# Patient Record
Sex: FEMALE | Race: WHITE
[De-identification: names, ages, dates, MRNs, and addresses within clinical notes are randomized per-mention and may not be internally consistent; named-entity substitution may affect disease eponyms.]

---

## 2019-10-01 ENCOUNTER — HOSPITAL ENCOUNTER (EMERGENCY)
Dept: HOSPITAL 47 - EC | Age: 54
LOS: 1 days | Discharge: HOME | End: 2019-10-02
Payer: MEDICARE

## 2019-10-01 VITALS
HEART RATE: 71 BPM | SYSTOLIC BLOOD PRESSURE: 118 MMHG | DIASTOLIC BLOOD PRESSURE: 67 MMHG | RESPIRATION RATE: 18 BRPM | TEMPERATURE: 98.1 F

## 2019-10-01 DIAGNOSIS — Z87.891: ICD-10-CM

## 2019-10-01 DIAGNOSIS — Z88.2: ICD-10-CM

## 2019-10-01 DIAGNOSIS — S63.91XA: Primary | ICD-10-CM

## 2019-10-01 DIAGNOSIS — Z88.1: ICD-10-CM
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
## 2019-10-01 PROCEDURE — 73130 X-RAY EXAM OF HAND: CPT

## 2019-10-01 PROCEDURE — 99283 EMERGENCY DEPT VISIT LOW MDM: CPT

## 2019-10-01 PROCEDURE — 96372 THER/PROPH/DIAG INJ SC/IM: CPT

## 2019-10-02 NOTE — ED
Upper Extremity HPI





- General


Chief Complaint: Extremity Injury, Upper


Stated Complaint: Finger Injury


Time Seen by Provider: 10/02/19 00:03


Source: patient, family


Mode of arrival: ambulatory


Limitations: physical limitation





- History of Present Illness


Initial Comments: 


54-year-old female patient presents to the emergency department today for 

evaluation of injury to the right hand.  Patient states that she was attempting 

to open a ladder became frustrated and slammed it down with her hand.  States 

this causes pain to the right middle MCP joint.  Patient states that the pain 

has been worsening over the last 2 hours.  States it feels like it is crackling 

when she bends it.  Denies any history of injury to the hand.  Denies taking any

medication for her symptoms.  Denies any other injuries or concerns. Patient 

denies any headache, neck pain, back pain, chest pain, shortness of breath, 

dizziness, weakness, abdominal pain, nausea, vomiting, or difficulties with 

bowel movements or urination.








- Related Data


                                    Allergies











Allergy/AdvReac Type Severity Reaction Status Date / Time


 


levofloxacin Allergy  Hallucinati Verified 10/01/19 23:58





   ons  


 


Sulfa (Sulfonamide Allergy  Anaphylaxis Verified 10/01/19 23:58





Antibiotics)     














Review of Systems


ROS Statement: 


Those systems with pertinent positive or pertinent negative responses have been 

documented in the HPI.





ROS Other: All systems not noted in ROS Statement are negative.





Past Medical History


Past Medical History: No Reported History


History of Any Multi-Drug Resistant Organisms: None Reported


Past Surgical History: Adenoidectomy, Appendectomy, Hysterectomy, Tonsillectomy


Additional Past Surgical History / Comment(s): NECK SX CERVICAL 3, 4, LEFT FOOT


Past Psychological History: No Psychological Hx Reported


Smoking Status: Former smoker


Past Alcohol Use History: None Reported


Past Drug Use History: None Reported





General Exam


Limitations: physical limitation


General appearance: alert, in no apparent distress, other (This is a well-

developed, well-nourished adult female patient in no acute distress.  Vital 

signs upon presentation are temperature 98.1F, pulse 71, respirations 18, blood

pressure 118/67, pulse ox 100% on room air.)


Respiratory exam: Present: normal lung sounds bilaterally.  Absent: respiratory 

distress, wheezes, rales, rhonchi, stridor


Cardiovascular Exam: Present: regular rate, normal rhythm, normal heart sounds. 

Absent: systolic murmur, diastolic murmur, rubs, gallop, clicks


Extremities exam: Present: full ROM, tenderness (Over the right third MCP 

joint), normal capillary refill, other (Swelling over the right third MCP joint.

 Skin is otherwise pink, warm, dry.  Cap refills less than 3 seconds.  Radial 

pulses 2+ and equal bilaterally.).  Absent: normal inspection, pedal edema, 

joint swelling, calf tenderness


Neurological exam: Present: alert, oriented X3, CN II-XII intact


Psychiatric exam: Present: normal affect, normal mood


Skin exam: Present: warm, dry, intact, normal color.  Absent: rash





Course


                                   Vital Signs











  10/01/19





  23:53


 


Temperature 98.1 F


 


Pulse Rate 71


 


Respiratory 18





Rate 


 


Blood Pressure 118/67


 


O2 Sat by Pulse 100





Oximetry 














Medical Decision Making





- Medical Decision Making


54-year-old female patient presented to the emergency department today for 

evaluation of right hand pain especially over the right third MCP joint.  

Physical examination did reveal some mild soft tissue swelling.  There was full 

range of motion.  Neurovascular status intact.  X-ray was obtained and showed no

acute abnormalities.  Patient was diagnosed with sprain.  She is educated 

regarding rest, ice, elevation.  She is instructed take anti-inflammatory 

medication for pain relief.  She is instructed to have repeat x-rays performed 

in 7-10 days if pain symptoms persist.  She is instructed to follow-up with her 

primary care physician for recheck in 1-2 days.  Return parameters were 

discussed in detail.  She verbalizes understanding and agrees with this plan.








- Radiology Data


Radiology results: report reviewed, image reviewed


3 views of the right hand are obtained.  Report was reviewed in its entirety.  

Impression by Dr. Martin shows negative right hand exam.  No fracture seen.  

Middle finger is intact.





Disposition


Clinical Impression: 


 Sprain of right hand





Disposition: HOME SELF-CARE


Condition: Good


Instructions (If sedation given, give patient instructions):  Hand Sprain (ED)


Additional Instructions: 


Use Ace wrap for comfort and support.  Take over-the-counter ibuprofen for pain 

control.  Apply ice, keep the hand elevated.  Follow-up with your primary care 

physician for recheck in 1-2 days.  Return to the emergency department for any 

new, worsening, or concerning symptoms.


Is patient prescribed a controlled substance at d/c from ED?: No


Referrals: 


Jaiden Nick MD [Primary Care Provider] - 1-2 days


Time of Disposition: 00:55

## 2019-10-02 NOTE — XR
EXAMINATION TYPE: XR hand complete RT

 

DATE OF EXAM: 10/2/2019

 

COMPARISON: NONE

 

HISTORY: Finger injury

 

TECHNIQUE: 3 views

 

FINDINGS: I see no fracture nor dislocation. Metacarpals are intact. Joint spaces are normal. There a
re no erosions.

 

IMPRESSION: Negative right hand exam. No fracture seen. Middle finger is intact.

## 2020-07-29 ENCOUNTER — HOSPITAL ENCOUNTER (OUTPATIENT)
Dept: HOSPITAL 47 - RADCTMAIN | Age: 55
Discharge: HOME | End: 2020-07-29
Attending: NEUROLOGICAL SURGERY
Payer: MEDICARE

## 2020-07-29 DIAGNOSIS — Z47.89: Primary | ICD-10-CM

## 2020-07-29 DIAGNOSIS — Z98.1: ICD-10-CM

## 2020-07-29 DIAGNOSIS — M48.02: ICD-10-CM

## 2020-07-29 PROCEDURE — 72125 CT NECK SPINE W/O DYE: CPT

## 2020-07-29 NOTE — CT
EXAMINATION TYPE: CT cervical spine wo con

 

DATE OF EXAM: 7/29/2020

 

COMPARISON: None

 

HISTORY: continued pain post surgery with bilateral radiation to the extremities

 

CT DLP: 302.5 mGycm

Automated exposure control for dose reduction was used.

 

TECHNIQUE:  CT scan of the cervical spine is obtained without contrast, axial images are obtained, sa
gittal and coronal reformatted images are also reviewed.

 

FINDINGS: Anterior fixation of C4-C6 with vertebral body screws, anterior fixation plate, and interbo
dy fusion devices. No evidence of hardware fracture or significant loosening around the vertebral bod
y screws. There is moderate to severe left C5-C6 neural foramina narrowing. There is no definitive ca
nal stenosis. There is streak artifact somewhat limiting evaluation of the canal at C4-5 and C5-6. No
 evidence of acute fracture or subluxation. Cervical spine is visualized in its entirety from C1 thro
ugh upper thoracic levels, demonstrates satisfactory alignment. Prevertebral soft tissue appears with
in normal limits.  The C1-C2 articulation is within normal limits on the coronal images.  

 

IMPRESSION: Postsurgical anterior fixation and fusion changes of C4-C6, with CT evidence of hardware 
failure. There is moderate to severe left C5-C6 neural foramina narrowing. No definitive canal stenos
is.

## 2021-01-12 ENCOUNTER — HOSPITAL ENCOUNTER (OUTPATIENT)
Dept: HOSPITAL 47 - RADMRIMAIN | Age: 56
Discharge: HOME | End: 2021-01-12
Attending: NEUROLOGICAL SURGERY
Payer: MEDICARE

## 2021-01-12 DIAGNOSIS — M51.16: Primary | ICD-10-CM

## 2021-01-12 DIAGNOSIS — M41.86: ICD-10-CM

## 2021-01-12 DIAGNOSIS — M47.26: ICD-10-CM

## 2021-01-12 PROCEDURE — 72148 MRI LUMBAR SPINE W/O DYE: CPT

## 2021-01-12 NOTE — MR
EXAMINATION TYPE: MR lumbar spine wo con

 

DATE OF EXAM: 1/12/2021

 

COMPARISON: None

 

HISTORY: Lower back pain into both legs, more so on right, Also shoots up middle of spine x several y
ears

 

TECHNIQUE: 

Multiplanar, multisequence images of the lumbar spine were acquired.

 

L1-L2: Normal disc appearance without desiccation.  No herniation, protrusion or disc bulging.  No ca
nal stenosis is present.  Foramina are patent bilaterally.

 

L2-L3: Minimal posterior broad-based disc bulge causes slight anterior mass effect on the thecal sac.
 There is some facet arthropathy change present.

 

L3-L4: Posterior extension endplate disc complex causes minimal anterior mass effect on the thecal sa
c. No significant foraminal encroachment. There is some facet arthropathy change present with hypertr
ophy of ligamentum flavum.

 

L4-L5: Minimal posterior broad-based disc bulge causes slight anterior mass effect on the thecal sac,
 circumferential extension is present towards the foramina without significant foraminal encroachment
. There is facet arthropathy change with hypertrophy ligamentum flavum causing some posterior lateral
 mass effect on the thecal sac.

 

L5-S1: Normal disc appearance without desiccation.  No herniation, protrusion or disc bulging.  No ca
nal stenosis is present.  Foramina are patent bilaterally. There is facet arthropathy change.

 

Lumbar segments are intact.  No paraspinal masses are identified.  Conus medullaris has a normal appe
arance. There is a levoscoliosis centered at the upper lumbar spine. There is multilevel spondylosis 
with minimal endplate discogenic marrow signal change. Loss of disc height signal greatest at L2-3, L
3-4, minimal grade 1 anterolisthesis present at these levels. There is no significant spinal stenosis
.

 

The common bile duct appears dilated, there are dilated intrahepatic biliary ducts. Lung bases are cl
ear. Gallbladder appears somewhat distended.

 

IMPRESSION:

Mild degenerative disc disease, facet arthropathy, there is a scoliosis. There is biliary ductal dila
tation present, consider alternate dedicated imaging, gastroenterology consult.

## 2025-07-22 ENCOUNTER — HOSPITAL ENCOUNTER (OUTPATIENT)
Dept: HOSPITAL 47 - ORWHC2ENDO | Age: 60
Discharge: HOME | End: 2025-07-22
Attending: INTERNAL MEDICINE
Payer: MEDICARE

## 2025-07-22 VITALS — DIASTOLIC BLOOD PRESSURE: 83 MMHG | SYSTOLIC BLOOD PRESSURE: 138 MMHG | RESPIRATION RATE: 16 BRPM

## 2025-07-22 VITALS — BODY MASS INDEX: 17.9 KG/M2

## 2025-07-22 VITALS — HEART RATE: 67 BPM

## 2025-07-22 VITALS — TEMPERATURE: 97.2 F

## 2025-07-22 DIAGNOSIS — K58.9: ICD-10-CM

## 2025-07-22 DIAGNOSIS — Z79.899: ICD-10-CM

## 2025-07-22 DIAGNOSIS — M54.2: ICD-10-CM

## 2025-07-22 DIAGNOSIS — K92.2: Primary | ICD-10-CM

## 2025-07-22 DIAGNOSIS — Z79.51: ICD-10-CM

## 2025-07-22 DIAGNOSIS — J45.909: ICD-10-CM

## 2025-07-22 DIAGNOSIS — Z88.2: ICD-10-CM

## 2025-07-22 DIAGNOSIS — Z88.1: ICD-10-CM

## 2025-07-22 DIAGNOSIS — G43.909: ICD-10-CM

## 2025-07-22 DIAGNOSIS — C76.0: ICD-10-CM

## 2025-07-22 PROCEDURE — 45378 DIAGNOSTIC COLONOSCOPY: CPT

## 2025-07-22 RX ADMIN — ONDANSETRON STA MG: 2 INJECTION INTRAMUSCULAR; INTRAVENOUS at 09:10

## 2025-07-22 RX ADMIN — POTASSIUM CHLORIDE SCH MLS/HR: 14.9 INJECTION, SOLUTION INTRAVENOUS at 09:05

## 2025-07-22 RX ADMIN — POTASSIUM CHLORIDE ONE MLS: 14.9 INJECTION, SOLUTION INTRAVENOUS at 09:04
